# Patient Record
Sex: FEMALE | Race: WHITE | NOT HISPANIC OR LATINO | URBAN - METROPOLITAN AREA
[De-identification: names, ages, dates, MRNs, and addresses within clinical notes are randomized per-mention and may not be internally consistent; named-entity substitution may affect disease eponyms.]

---

## 2020-07-13 ENCOUNTER — OFFICE VISIT - HEALTHEAST (OUTPATIENT)
Dept: FAMILY MEDICINE | Facility: CLINIC | Age: 20
End: 2020-07-13

## 2020-07-13 DIAGNOSIS — R21 RASH: ICD-10-CM

## 2020-07-13 LAB — DEPRECATED S PYO AG THROAT QL EIA: NORMAL

## 2020-07-13 RX ORDER — DESOGESTREL AND ETHINYL ESTRADIOL 0.15-0.03
1 KIT ORAL
Status: SHIPPED | COMMUNITY
Start: 2020-07-13

## 2020-07-13 RX ORDER — BUPROPION HYDROCHLORIDE 150 MG/1
TABLET ORAL
Status: SHIPPED | COMMUNITY
Start: 2020-07-01

## 2020-07-14 LAB — GROUP A STREP BY PCR: NORMAL

## 2021-06-04 VITALS
RESPIRATION RATE: 16 BRPM | OXYGEN SATURATION: 99 % | SYSTOLIC BLOOD PRESSURE: 108 MMHG | TEMPERATURE: 98.2 F | WEIGHT: 110.38 LBS | DIASTOLIC BLOOD PRESSURE: 74 MMHG | HEART RATE: 83 BPM

## 2021-06-18 NOTE — PATIENT INSTRUCTIONS - HE
Patient Instructions by Elodia Chen CNP at 7/13/2020 11:40 AM     Author: Elodia Chen CNP Service: -- Author Type: Nurse Practitioner    Filed: 7/13/2020 12:20 PM Encounter Date: 7/13/2020 Status: Signed    : Elodia Chen CNP (Nurse Practitioner)       We think it looks most like molluscum.  This resolves on its own.  May want second opinion from dermatology.  Read attachment to prevent spread.  Skin to skin contact could spread it to your boyfriend, for example.      Patient Education     Molluscum Contagiosum (Adult)  Molluscum contagiosum is a common skin infection. It is caused by a pox virus. The infection results in raised, flesh-colored bumps with central umbilication on the skin. The bumps are sometimes itchy, but not painful. They may spread or form lines when scratched. Almost any area of skin can be affected. Common sites include the face, neck, armpit, arms, hands, and genitals.    Molluscum contagiosum spreads easily from one part of the body to another. It spreads through scratching or other contact. It can also spread from person to person. This often happens through shared clothing, towels, or objects such as shared sports gear. It can spread during contact sports or sexual contact.  Because it is caused by a virus, antibiotics do not help. The infection usually goes away on its own within a period of 6 to 18 months, but will spread if not treated. The infection may continue in people with a weakened immune system. This includes people with diabetes, cancer, or HIV.  If the bumps are bothersome or unsightly, treatment may remove them. This may include scraping, freezing, or by applying an acid, blistering solution, or a cream that modulates the immune system.  Home care  The healthcare provider can prescribe a medicine to help the bumps heal. Follow the providers instructions for using these medicines.    The following are general care guidelines:    Avoid scratching the rash.  Scratching spreads the infection. If needed, cover affected skin with bandages to help prevent scratching.    Wash your hands before and after caring for the rash.    Do not share towels, washcloths, or clothing with anyone.    Avoid shaving any areas where the bumps are present.    Avoid sex if the bumps are in the genital area.    If participating in contact sports or other activity that involves skin-to-skin contact, cover all affected skin with clothing or bandages.    Avoid swimming in public pools until the rash clears.  Follow-up care  Follow up with your healthcare provider, or as advised.  When to seek medical advice  Call your healthcare provider right away if any of these occur:    Fever of 100.4 F (38 C) or higher    Signs of infection, such as warmth, pain, oozing, or redness    Bumps appear on a new area of the body or seem to be spreading rapidly  Date Last Reviewed: 1/12/2016 2000-2017 [employer]. [address] [place] [address], [place] [address]. All rights reserved. This information is not intended as a substitute for professional medical care. Always follow your healthcare professional's instructions.

## 2021-06-29 NOTE — PROGRESS NOTES
Progress Notes by Elodia Chen CNP at 7/13/2020 11:40 AM     Author: Elodia Chen CNP Service: -- Author Type: Nurse Practitioner    Filed: 7/13/2020  6:34 PM Encounter Date: 7/13/2020 Status: Signed    : Elodia Chen CNP (Nurse Practitioner)       Chief Complaint   Patient presents with   ? Rash     x5d, on torso, denies any pain or itching       ASSESSMENT & PLAN:   Diagnoses and all orders for this visit:    Rash  -     Rapid Strep A Screen-Throat  -     Group A Strep, RNA Direct Detection, Throat  -     Ambulatory referral to Dermatology        MDM:  Discussed with Dr. Montero in the walk-in clinic who also looked at the rash.  Is somewhat visibly consistent with molluscum, especially when squeezing an individual papule.  No other obvious trigger such as insect bites nor swelling, identified based on history.  Many rash type such as bedbugs or scabies would not be consistent with lack of pruritus.     Patient to treat this as molluscum for now, recommended second opinion from dermatology as the rash all appeared at the same time spontaneously overnight to the best of her knowledge.  No indication for specific treatment at this time.  Reviewed handout on molluscum.     She did show me her boyfriend's rash which is not similar to her rash.    Supportive care discussed.  See discharge instructions below for specific recommendations given.    At the end of the encounter, I discussed results, diagnosis, medications. Discussed red flags for immediate return to clinic/ER, as well as indications for follow up if no improvement. Patient and/or caregiver understood and agreed to plan. Patient was stable for discharge.    SUBJECTIVE    HPI:  HPI  Debby ASTUDILLO [place] presents to the walk-in clinic with   Chief Complaint   Patient presents with   ? Rash     x5d, on torso, denies any pain or itching     Patient states she awoke 5 days ago with anterior trunk papular rash without other symptoms.  No itching.   Nontender, no pain.  Rash has not changed since it started.    Each papule is discrete in about 2 to 3 mm.      Was swimming 1 week before onset of rash, but no swimming nor similar prior to onset of rash.  No hot tubs.     He has no pets with fur.    Associated with: History of molluscum as a child.        Known exposures: Denies similar rashes in family members.  Boyfriend has a trunk rash, but looks different, more consistent with mosquito bites.    See ROS for additional symptoms and/or pertinent negatives.       History obtained from the patient.    No past medical history on file.    There are no active non-hospital problems to display for this patient.      No family history on file.    Social History     Tobacco Use   ? Smoking status: Never Smoker   ? Smokeless tobacco: Never Used   ? Tobacco comment: vape   Substance Use Topics   ? Alcohol use: Not on file       Review of Systems   Constitutional: Negative for fever.   HENT: Negative for congestion and sore throat.    Respiratory: Negative for cough.    All other systems reviewed and are negative.      OBJECTIVE    Vitals:    07/13/20 1140   BP: 108/74   Patient Site: Right Arm   Patient Position: Sitting   Cuff Size: Adult Regular   Pulse: 83   Resp: 16   Temp: 98.2  F (36.8  C)   TempSrc: Oral   SpO2: 99%   Weight: 110 lb 6 oz (50.1 kg)       Physical Exam  Constitutional:       Appearance: She is well-developed.   Eyes:      Conjunctiva/sclera: Conjunctivae normal.   Cardiovascular:      Rate and Rhythm: Normal rate.      Pulses: Normal pulses.   Pulmonary:      Effort: Pulmonary effort is normal.   Musculoskeletal:      Comments: Normal gait    Skin:     General: Skin is warm and dry.      Findings: Rash present.      Comments: Scattered, discrete approximately 2 to 3 mm erythematous papules.  When squeezing, some do appear domed in appearance similar to molluscum.  Nontender, no drainage.  No hair in area of rash.    Neurological:      Mental Status:  She is alert and oriented to person, place, and time.   Psychiatric:         Mood and Affect: Mood normal.         Behavior: Behavior normal.         Thought Content: Thought content normal.         Judgment: Judgment normal.         Labs:  Recent Results (from the past 240 hour(s))   Rapid Strep A Screen-Throat    Specimen: Throat   Result Value Ref Range    Rapid Strep A Antigen No Group A Strep detected, presumptive negative No Group A Strep detected, presumptive negative         Radiology:    No results found.    PATIENT INSTRUCTIONS:   Patient Instructions   We think it looks most like molluscum.  This resolves on its own.  May want second opinion from dermatology.  Read attachment to prevent spread.  Skin to skin contact could spread it to your boyfriend, for example.      Patient Education     Molluscum Contagiosum (Adult)  Molluscum contagiosum is a common skin infection. It is caused by a pox virus. The infection results in raised, flesh-colored bumps with central umbilication on the skin. The bumps are sometimes itchy, but not painful. They may spread or form lines when scratched. Almost any area of skin can be affected. Common sites include the face, neck, armpit, arms, hands, and genitals.    Molluscum contagiosum spreads easily from one part of the body to another. It spreads through scratching or other contact. It can also spread from person to person. This often happens through shared clothing, towels, or objects such as shared sports gear. It can spread during contact sports or sexual contact.  Because it is caused by a virus, antibiotics do not help. The infection usually goes away on its own within a period of 6 to 18 months, but will spread if not treated. The infection may continue in people with a weakened immune system. This includes people with diabetes, cancer, or HIV.  If the bumps are bothersome or unsightly, treatment may remove them. This may include scraping, freezing, or by applying an  acid, blistering solution, or a cream that modulates the immune system.  Home care  The healthcare provider can prescribe a medicine to help the bumps heal. Follow the providers instructions for using these medicines.    The following are general care guidelines:    Avoid scratching the rash. Scratching spreads the infection. If needed, cover affected skin with bandages to help prevent scratching.    Wash your hands before and after caring for the rash.    Do not share towels, washcloths, or clothing with anyone.    Avoid shaving any areas where the bumps are present.    Avoid sex if the bumps are in the genital area.    If participating in contact sports or other activity that involves skin-to-skin contact, cover all affected skin with clothing or bandages.    Avoid swimming in public pools until the rash clears.  Follow-up care  Follow up with your healthcare provider, or as advised.  When to seek medical advice  Call your healthcare provider right away if any of these occur:    Fever of 100.4 F (38 C) or higher    Signs of infection, such as warmth, pain, oozing, or redness    Bumps appear on a new area of the body or seem to be spreading rapidly  Date Last Reviewed: 1/12/2016 2000-2017 [employer]. [address] [place] [address], [place] [address]. All rights reserved. This information is not intended as a substitute for professional medical care. Always follow your healthcare professional's instructions.